# Patient Record
Sex: FEMALE | Race: WHITE | NOT HISPANIC OR LATINO | URBAN - METROPOLITAN AREA
[De-identification: names, ages, dates, MRNs, and addresses within clinical notes are randomized per-mention and may not be internally consistent; named-entity substitution may affect disease eponyms.]

---

## 2020-06-08 ENCOUNTER — CLAIMS CREATED FROM THE CLAIM WINDOW (OUTPATIENT)
Dept: URBAN - METROPOLITAN AREA CLINIC 97 | Facility: CLINIC | Age: 55
End: 2020-06-08
Payer: COMMERCIAL

## 2020-06-08 DIAGNOSIS — K51.80 CHRONIC PANCOLONIC ULCERATIVE COLITIS: ICD-10-CM

## 2020-06-08 PROCEDURE — 96413 CHEMO IV INFUSION 1 HR: CPT | Performed by: INTERNAL MEDICINE

## 2020-06-08 RX ORDER — OMEPRAZOLE 40 MG/1
TAKE 1 CAPSULE BY ORAL ROUTE 2 TIMES A DAY FOR 90 DAYS CAPSULE, DELAYED RELEASE PELLETS ORAL 2
Qty: 180 | Refills: 2 | Status: ACTIVE | COMMUNITY
Start: 2019-12-20 | End: 2020-09-15

## 2020-06-08 RX ORDER — ATENOLOL 50 MG
TABLET ORAL
Qty: 0 | Refills: 0 | Status: ACTIVE | COMMUNITY
Start: 1900-01-01 | End: 1900-01-01

## 2020-06-08 RX ORDER — CHOLESTYRAMINE 4 G/9G
TAKE 1 SCOOP (4 GRAM) DISSOLVED IN 2 TO 6 OUNCES OF WATER OR NONCARBONATED BEVERAGE BY ORAL ROUTE 2 TIMES PER DAY POWDER, FOR SUSPENSION ORAL 2
Qty: 60 | Refills: 0 | Status: ACTIVE | COMMUNITY
Start: 2019-06-21 | End: 1900-01-01

## 2020-07-14 ENCOUNTER — WEB ENCOUNTER (OUTPATIENT)
Dept: URBAN - METROPOLITAN AREA CLINIC 96 | Facility: CLINIC | Age: 55
End: 2020-07-14

## 2020-07-14 ENCOUNTER — LAB OUTSIDE AN ENCOUNTER (OUTPATIENT)
Dept: URBAN - METROPOLITAN AREA CLINIC 96 | Facility: CLINIC | Age: 55
End: 2020-07-14

## 2020-07-14 ENCOUNTER — OFFICE VISIT (OUTPATIENT)
Dept: URBAN - METROPOLITAN AREA CLINIC 96 | Facility: CLINIC | Age: 55
End: 2020-07-14
Payer: COMMERCIAL

## 2020-07-14 DIAGNOSIS — M25.50 JOINT PAIN: ICD-10-CM

## 2020-07-14 DIAGNOSIS — R94.5 ABNORMAL LFTS (LIVER FUNCTION TESTS): ICD-10-CM

## 2020-07-14 DIAGNOSIS — Z09 FOLLOW UP: ICD-10-CM

## 2020-07-14 DIAGNOSIS — K21.9 GERD: ICD-10-CM

## 2020-07-14 DIAGNOSIS — R19.7 DIARRHEA: ICD-10-CM

## 2020-07-14 DIAGNOSIS — K51.00 ULCERATIVE (CHRONIC) ENTEROCOLITIS: ICD-10-CM

## 2020-07-14 PROCEDURE — G8420 CALC BMI NORM PARAMETERS: HCPCS | Performed by: INTERNAL MEDICINE

## 2020-07-14 PROCEDURE — G9622 NO UNHEAL ETOH USER: HCPCS | Performed by: INTERNAL MEDICINE

## 2020-07-14 PROCEDURE — 1036F TOBACCO NON-USER: CPT | Performed by: INTERNAL MEDICINE

## 2020-07-14 PROCEDURE — G9903 PT SCRN TBCO ID AS NON USER: HCPCS | Performed by: INTERNAL MEDICINE

## 2020-07-14 PROCEDURE — 99214 OFFICE O/P EST MOD 30 MIN: CPT | Performed by: INTERNAL MEDICINE

## 2020-07-14 PROCEDURE — G8427 DOCREV CUR MEDS BY ELIG CLIN: HCPCS | Performed by: INTERNAL MEDICINE

## 2020-07-14 PROCEDURE — 99215 OFFICE O/P EST HI 40 MIN: CPT | Performed by: INTERNAL MEDICINE

## 2020-07-14 PROCEDURE — 3017F COLORECTAL CA SCREEN DOC REV: CPT | Performed by: INTERNAL MEDICINE

## 2020-07-14 PROCEDURE — G8482 FLU IMMUNIZE ORDER/ADMIN: HCPCS | Performed by: INTERNAL MEDICINE

## 2020-07-14 RX ORDER — OMEPRAZOLE 40 MG/1
1 TAB CAPSULE, DELAYED RELEASE PELLETS ORAL BID
Qty: 180 TABLET | Refills: 4
Start: 2019-12-20

## 2020-07-14 RX ORDER — ATENOLOL 50 MG
TABLET ORAL
Qty: 0 | Refills: 0 | Status: ACTIVE | COMMUNITY
Start: 1900-01-01 | End: 1900-01-01

## 2020-07-14 RX ORDER — OMEPRAZOLE 40 MG/1
TAKE 1 CAPSULE BY ORAL ROUTE 2 TIMES A DAY FOR 90 DAYS CAPSULE, DELAYED RELEASE PELLETS ORAL 2
Qty: 180 | Refills: 2 | Status: ACTIVE | COMMUNITY
Start: 2019-12-20 | End: 2020-09-15

## 2020-07-14 RX ORDER — CHOLESTYRAMINE 4 G/9G
TAKE 1 SCOOP (4 GRAM) DISSOLVED IN 2 TO 6 OUNCES OF WATER OR NONCARBONATED BEVERAGE BY ORAL ROUTE 2 TIMES PER DAY POWDER, FOR SUSPENSION ORAL 2
Qty: 60 | Refills: 0 | Status: ACTIVE | COMMUNITY
Start: 2019-06-21 | End: 1900-01-01

## 2020-07-14 RX ORDER — CHOLESTYRAMINE 4 G/9G
TAKE 1 SCOOP (4 GRAM) DISSOLVED IN 2 TO 6 OUNCES OF WATER OR NONCARBONATED BEVERAGE BY ORAL ROUTE 2 TIMES PER DAY POWDER, FOR SUSPENSION ORAL 2
OUTPATIENT
Start: 2019-06-21

## 2020-07-14 NOTE — HPI-TODAY'S VISIT:
Pt Terry is a 55 y/o individual with pan-UC, currently on Entyvio (started 9/2019 every 8 weeks), here for refractory disease. . Pt is referred by Dr. Mercado. . She was diagnosed with UC in 2009.  She started on ASA compound initially, stopped therapy due to cost.  Started on steroids.  Never has been on a biological medication. . Previously on 7/29/2019, she reports that she has a BM every hour.  No blood in the stool.  Abdominal pain is mainly on the right side. The pain is dull in nature, non-radiating, nothing makes it better or worse. . Previously on 9/4/2019, she reports that after starting Colazol, she developed a rash on her skin which was severe and required steroid topical agent.  She thus stopped the medication.  She has persistent diarrhea. . Previously on 10/16/2019, pt reports that since starting Entyvio (2 doses of load completed).  Her BM has improved in consistency somewhat, however, she has up to 8 per day (before was all day, now just in the morning).  Still has right sided abdominal pain, no blood. . Today on 7/14/2020, pt reports that her arthritis, 2 weeks prior her entyio, it is worse.  She is taking diclofenac for the joint pain.  No reflux sxs, well controlled on PPI.  . SH: Smoke and ETOH; Works special events/catering FH: Cousin with IBD  [facility] Labs: 6:2020: ast 52, alt 43, Cr 0.56,   9/2019: Hgb 14.2, ast 52, alt 31, Iron 55, quant-gold neg, hep B neg, B12 540,  PMH: HTN, UC . 12/2019: - High risk surveillance was performed with methylene blue dye and targetted biopsies of abnormal uptake. A localized area of erythematous mucosa was found in the cecum.  This was biopsied with a cold forceps for histology. The pathology specimen was placed into Bottle Number 1. - A 6 mm polyp was found in the ascending colon.  The polyp was sessile.  The polyp was removed with a cold snare. Resection and retrieval were complete.  The pathology specimen was placed into Bottle Number 5. - A less than 5 mm polyp was found in the transverse colon.  The polyp was sessile.  The polyp was removed with a cold biopsy forceps.  Resection and retrieval were complete.  The pathology specimen was placed into Bottle Number 2. - A less than 5 mm polyp was found in the sigmoid colon.  The polyp was sessile.  The polyp was removed with a jumbo cold forceps.  Resection and retrieval were complete.  The pathology specimen was placed into Bottle Number 3. - A localized area of erythematous mucosa was found in the rectum.  This was biopsied with a cold forceps for histology. The pathology specimen was placed into Bottle Number 4.  A.CECUM,RANDOMBIOPSY: -ARCHITECTURALLYUNREMARKABLECOLONMUCOSA. -NOACTIVITYORGRANULOMAS. -NODYSPLASIA.     B.SPECIMENSUBMITTEDASDESCENDINGCOLONPOLYP,BIOPSY: -ARCHITECTURALLYUNREMARKABLECOLONMUCOSA. -NOACTIVITYORGRANULOMAS.     -FOCUSOFHYPERPLASTICCRYPTS(L1). -NODYSPLASIA.     C.SPECIMENSUBMITTEDASSIGMOIDCOLONPOLYP,BIOPSY: -ARCHITECTURALLYUNREMARKABLECOLONMUCOSA. -NOACTIVITYORGRANULOMAS.     -FOCUSOFHYPERPLASTICCRYPTS. -NODYSPLASIA.     D.SPECIMENSUBMITTEDASRECTUMCOLONPOLYP,BIOPSY: -ARCHITECTURALLYUNREMARKABLECOLONMUCOSA. -NOACTIVITYORGRANULOMAS.     -FOCUSOFHYPERPLASTICCRYPTS. -NODYSPLASIA.     E.SPECIMENSUBMITTEDASASCENDINGCOLONPOLYP,BIOPSY: -ARCHITECTURALLYUNREMARKABLECOLONMUCOSA. -NOACTIVITYORGRANULOMAS. -NODYSPLASIA. . 6/2019 A.RIGHTCOLON,BIOPSIES: -COLONICMUCOSA,WITHINNORMALLIMITS. -NOHISTOLOGICEVIDENCEOFACTIVECOLITIS,GRANULOMAS,ORDYSPLASIA IDENTIFIED.     B.TRANSVERSECOLON,BIOPSIES: -COLONICMUCOSA,WITHINNORMALLIMITS. -NOHISTOLOGICEVIDENCEOFACTIVECOLITIS,GRANULOMAS,ORDYSPLASIA IDENTIFIED.     C.DESCENDINGCOLON,BIOPSIES: -ADENOMA-LIKEDYSPLASIAVERSUSLOWGRADEDYSPLASIA. -NOGRANULOMASORHIGHGRADEDYSPLASIAIDENTIFIED.     D.SIGMOIDCOLONBIOPSIES: -ADENOMA-LIKEDYSPLASIAVERSUSLOWGRADEDYSPLASIAANDFOCALACTIVE COLITIS. -NOGRANULOMASORHIGHGRADEDYSPLASIAIDENTIFIED. . 5/2019: A Duodenum,biopsy: Smallbowelmucosawithnosignificanthistopathology.Nohistologicevidenceofceliacsprue microorganisms.     B Stomach,body,antrum,biopsy: Chronicgastritis.NoHelicobacterpylorimicroorganismsareidentifiedwithaspecialstain.     C Esophagogastricjunction,biopsy: Squamocolumnarmucosawithrefluxesophagitis.Nointestinalmetaplasiaispresent.Analcia negativeforbothfungalelementsandintestinalmetaplasia.     D Sigmoidcolon,polypectomy: Adenoma-likedysplasia.Seemicroscopicdescription.     E Rightcolon,biopsy: Colonicmucosawithnosignificanthistopathology.Nohistologicevidenceofactivecolitis,gran     F Leftcolon,biopsy: Adenoma-likedysplasia.Seemicroscopicdescription.    G Descendingcolon,polypectomy: Adenoma-likedysplasia.Seemicroscopicdescription.     H Rectum,polyp,biopsy: Adenoma-likedysplasia.Seemicroscopicdescription

## 2020-07-21 ENCOUNTER — LAB OUTSIDE AN ENCOUNTER (OUTPATIENT)
Dept: URBAN - METROPOLITAN AREA CLINIC 96 | Facility: CLINIC | Age: 55
End: 2020-07-21

## 2020-08-03 ENCOUNTER — OFFICE VISIT (OUTPATIENT)
Dept: URBAN - METROPOLITAN AREA CLINIC 97 | Facility: CLINIC | Age: 55
End: 2020-08-03
Payer: COMMERCIAL

## 2020-08-03 DIAGNOSIS — K51.80 CHRONIC PANCOLONIC ULCERATIVE COLITIS: ICD-10-CM

## 2020-08-03 PROCEDURE — 96413 CHEMO IV INFUSION 1 HR: CPT | Performed by: INTERNAL MEDICINE

## 2020-08-03 RX ORDER — ATENOLOL 50 MG
TABLET ORAL
Qty: 0 | Refills: 0 | Status: ACTIVE | COMMUNITY
Start: 1900-01-01 | End: 1900-01-01

## 2020-08-03 RX ORDER — OMEPRAZOLE 40 MG/1
1 TAB CAPSULE, DELAYED RELEASE PELLETS ORAL BID
Qty: 180 TABLET | Refills: 4 | Status: ACTIVE | COMMUNITY
Start: 2019-12-20

## 2020-09-16 ENCOUNTER — OFFICE VISIT (OUTPATIENT)
Dept: URBAN - METROPOLITAN AREA CLINIC 97 | Facility: CLINIC | Age: 55
End: 2020-09-16
Payer: COMMERCIAL

## 2020-09-16 DIAGNOSIS — K51.80 CHRONIC PANCOLONIC ULCERATIVE COLITIS: ICD-10-CM

## 2020-09-16 PROCEDURE — 96413 CHEMO IV INFUSION 1 HR: CPT | Performed by: INTERNAL MEDICINE

## 2020-09-16 RX ORDER — OMEPRAZOLE 40 MG/1
1 TAB CAPSULE, DELAYED RELEASE PELLETS ORAL BID
Qty: 180 TABLET | Refills: 4 | Status: ACTIVE | COMMUNITY
Start: 2019-12-20

## 2020-09-16 RX ORDER — ATENOLOL 50 MG
TABLET ORAL
Qty: 0 | Refills: 0 | Status: ACTIVE | COMMUNITY
Start: 1900-01-01 | End: 1900-01-01

## 2020-10-01 ENCOUNTER — OFFICE VISIT (OUTPATIENT)
Dept: URBAN - METROPOLITAN AREA CLINIC 117 | Facility: CLINIC | Age: 55
End: 2020-10-01

## 2020-10-28 ENCOUNTER — OFFICE VISIT (OUTPATIENT)
Dept: URBAN - METROPOLITAN AREA CLINIC 97 | Facility: CLINIC | Age: 55
End: 2020-10-28
Payer: COMMERCIAL

## 2020-10-28 DIAGNOSIS — K51.80 CHRONIC PANCOLONIC ULCERATIVE COLITIS: ICD-10-CM

## 2020-10-28 PROCEDURE — 96413 CHEMO IV INFUSION 1 HR: CPT | Performed by: INTERNAL MEDICINE

## 2020-10-28 RX ORDER — ATENOLOL 50 MG
TABLET ORAL
Qty: 0 | Refills: 0 | Status: ACTIVE | COMMUNITY
Start: 1900-01-01 | End: 1900-01-01

## 2020-10-28 RX ORDER — OMEPRAZOLE 40 MG/1
1 TAB CAPSULE, DELAYED RELEASE PELLETS ORAL BID
Qty: 180 TABLET | Refills: 4 | Status: ACTIVE | COMMUNITY
Start: 2019-12-20

## 2020-12-07 ENCOUNTER — OFFICE VISIT (OUTPATIENT)
Dept: URBAN - METROPOLITAN AREA CLINIC 97 | Facility: CLINIC | Age: 55
End: 2020-12-07
Payer: COMMERCIAL

## 2020-12-07 VITALS
HEIGHT: 65 IN | DIASTOLIC BLOOD PRESSURE: 66 MMHG | TEMPERATURE: 96.8 F | SYSTOLIC BLOOD PRESSURE: 115 MMHG | RESPIRATION RATE: 17 BRPM | HEART RATE: 69 BPM | WEIGHT: 163 LBS | BODY MASS INDEX: 27.16 KG/M2

## 2020-12-07 DIAGNOSIS — K51.80 CHRONIC PANCOLONIC ULCERATIVE COLITIS: ICD-10-CM

## 2020-12-07 PROCEDURE — 96413 CHEMO IV INFUSION 1 HR: CPT | Performed by: INTERNAL MEDICINE

## 2020-12-07 RX ORDER — OMEPRAZOLE 40 MG/1
1 TAB CAPSULE, DELAYED RELEASE PELLETS ORAL BID
Qty: 180 TABLET | Refills: 4 | Status: ACTIVE | COMMUNITY
Start: 2019-12-20

## 2020-12-07 RX ORDER — ATENOLOL 50 MG
TABLET ORAL
Qty: 0 | Refills: 0 | Status: ACTIVE | COMMUNITY
Start: 1900-01-01 | End: 1900-01-01

## 2021-01-06 ENCOUNTER — OFFICE VISIT (OUTPATIENT)
Dept: URBAN - METROPOLITAN AREA CLINIC 97 | Facility: CLINIC | Age: 56
End: 2021-01-06

## 2021-01-11 ENCOUNTER — OFFICE VISIT (OUTPATIENT)
Dept: URBAN - METROPOLITAN AREA CLINIC 97 | Facility: CLINIC | Age: 56
End: 2021-01-11
Payer: COMMERCIAL

## 2021-01-11 DIAGNOSIS — K51.80 CHRONIC PANCOLONIC ULCERATIVE COLITIS: ICD-10-CM

## 2021-01-11 PROCEDURE — 96365 THER/PROPH/DIAG IV INF INIT: CPT | Performed by: INTERNAL MEDICINE

## 2021-01-11 RX ORDER — ATENOLOL 50 MG
TABLET ORAL
Qty: 0 | Refills: 0 | Status: ACTIVE | COMMUNITY
Start: 1900-01-01 | End: 1900-01-01

## 2021-01-11 RX ORDER — OMEPRAZOLE 40 MG/1
1 TAB CAPSULE, DELAYED RELEASE PELLETS ORAL BID
Qty: 180 TABLET | Refills: 4 | Status: ACTIVE | COMMUNITY
Start: 2019-12-20

## 2021-01-13 ENCOUNTER — OFFICE VISIT (OUTPATIENT)
Dept: URBAN - METROPOLITAN AREA CLINIC 91 | Facility: CLINIC | Age: 56
End: 2021-01-13

## 2021-02-15 ENCOUNTER — OFFICE VISIT (OUTPATIENT)
Dept: URBAN - METROPOLITAN AREA CLINIC 97 | Facility: CLINIC | Age: 56
End: 2021-02-15
Payer: COMMERCIAL

## 2021-02-15 DIAGNOSIS — K51.80 CHRONIC PANCOLONIC ULCERATIVE COLITIS: ICD-10-CM

## 2021-02-15 PROCEDURE — 96365 THER/PROPH/DIAG IV INF INIT: CPT | Performed by: INTERNAL MEDICINE

## 2021-02-15 RX ORDER — ATENOLOL 50 MG
TABLET ORAL
Qty: 0 | Refills: 0 | Status: ACTIVE | COMMUNITY
Start: 1900-01-01 | End: 1900-01-01

## 2021-02-15 RX ORDER — OMEPRAZOLE 40 MG/1
1 TAB CAPSULE, DELAYED RELEASE PELLETS ORAL BID
Qty: 180 TABLET | Refills: 4 | Status: ACTIVE | COMMUNITY
Start: 2019-12-20

## 2021-03-22 ENCOUNTER — OFFICE VISIT (OUTPATIENT)
Dept: URBAN - METROPOLITAN AREA CLINIC 97 | Facility: CLINIC | Age: 56
End: 2021-03-22
Payer: COMMERCIAL

## 2021-03-22 DIAGNOSIS — K51.80 CHRONIC PANCOLONIC ULCERATIVE COLITIS: ICD-10-CM

## 2021-03-22 PROCEDURE — 96365 THER/PROPH/DIAG IV INF INIT: CPT | Performed by: INTERNAL MEDICINE

## 2021-03-22 RX ORDER — OMEPRAZOLE 40 MG/1
1 TAB CAPSULE, DELAYED RELEASE PELLETS ORAL BID
Qty: 180 TABLET | Refills: 4 | Status: ACTIVE | COMMUNITY
Start: 2019-12-20

## 2021-03-22 RX ORDER — ATENOLOL 50 MG
TABLET ORAL
Qty: 0 | Refills: 0 | Status: ACTIVE | COMMUNITY
Start: 1900-01-01 | End: 1900-01-01

## 2021-04-27 ENCOUNTER — OFFICE VISIT (OUTPATIENT)
Dept: URBAN - METROPOLITAN AREA CLINIC 97 | Facility: CLINIC | Age: 56
End: 2021-04-27
Payer: COMMERCIAL

## 2021-04-27 DIAGNOSIS — K51.80 CHRONIC PANCOLONIC ULCERATIVE COLITIS: ICD-10-CM

## 2021-04-27 PROCEDURE — 96365 THER/PROPH/DIAG IV INF INIT: CPT | Performed by: INTERNAL MEDICINE

## 2021-04-27 RX ORDER — OMEPRAZOLE 40 MG/1
1 TAB CAPSULE, DELAYED RELEASE PELLETS ORAL BID
Qty: 180 TABLET | Refills: 4 | Status: ACTIVE | COMMUNITY
Start: 2019-12-20

## 2021-04-27 RX ORDER — ATENOLOL 50 MG
TABLET ORAL
Qty: 0 | Refills: 0 | Status: ACTIVE | COMMUNITY
Start: 1900-01-01 | End: 1900-01-01

## 2021-06-01 ENCOUNTER — OFFICE VISIT (OUTPATIENT)
Dept: URBAN - METROPOLITAN AREA CLINIC 97 | Facility: CLINIC | Age: 56
End: 2021-06-01

## 2021-06-03 ENCOUNTER — OFFICE VISIT (OUTPATIENT)
Dept: URBAN - METROPOLITAN AREA CLINIC 97 | Facility: CLINIC | Age: 56
End: 2021-06-03

## 2021-06-03 RX ORDER — ATENOLOL 50 MG
TABLET ORAL
Qty: 0 | Refills: 0 | Status: ACTIVE | COMMUNITY
Start: 1900-01-01 | End: 1900-01-01

## 2021-06-03 RX ORDER — OMEPRAZOLE 40 MG/1
1 TAB CAPSULE, DELAYED RELEASE PELLETS ORAL BID
Qty: 180 TABLET | Refills: 4 | Status: ACTIVE | COMMUNITY
Start: 2019-12-20

## 2021-06-10 ENCOUNTER — OFFICE VISIT (OUTPATIENT)
Dept: URBAN - METROPOLITAN AREA CLINIC 97 | Facility: CLINIC | Age: 56
End: 2021-06-10
Payer: COMMERCIAL

## 2021-06-10 DIAGNOSIS — K51.80 CHRONIC PANCOLONIC ULCERATIVE COLITIS: ICD-10-CM

## 2021-06-10 PROCEDURE — 96365 THER/PROPH/DIAG IV INF INIT: CPT | Performed by: INTERNAL MEDICINE

## 2021-06-10 RX ORDER — ATENOLOL 50 MG
TABLET ORAL
Qty: 0 | Refills: 0 | Status: ACTIVE | COMMUNITY
Start: 1900-01-01 | End: 1900-01-01

## 2021-06-10 RX ORDER — OMEPRAZOLE 40 MG/1
1 TAB CAPSULE, DELAYED RELEASE PELLETS ORAL BID
Qty: 180 TABLET | Refills: 4 | Status: ACTIVE | COMMUNITY
Start: 2019-12-20

## 2021-07-06 ENCOUNTER — OFFICE VISIT (OUTPATIENT)
Dept: URBAN - METROPOLITAN AREA CLINIC 97 | Facility: CLINIC | Age: 56
End: 2021-07-06

## 2021-07-13 ENCOUNTER — OFFICE VISIT (OUTPATIENT)
Dept: URBAN - METROPOLITAN AREA CLINIC 97 | Facility: CLINIC | Age: 56
End: 2021-07-13

## 2021-07-13 RX ORDER — OMEPRAZOLE 40 MG/1
1 TAB CAPSULE, DELAYED RELEASE PELLETS ORAL BID
Qty: 180 TABLET | Refills: 4 | Status: ACTIVE | COMMUNITY
Start: 2019-12-20

## 2021-07-13 RX ORDER — ATENOLOL 50 MG
TABLET ORAL
Qty: 0 | Refills: 0 | Status: ACTIVE | COMMUNITY
Start: 1900-01-01 | End: 1900-01-01

## 2021-07-27 ENCOUNTER — OFFICE VISIT (OUTPATIENT)
Dept: URBAN - METROPOLITAN AREA CLINIC 97 | Facility: CLINIC | Age: 56
End: 2021-07-27
Payer: COMMERCIAL

## 2021-07-27 ENCOUNTER — TELEPHONE ENCOUNTER (OUTPATIENT)
Dept: URBAN - METROPOLITAN AREA CLINIC 97 | Facility: CLINIC | Age: 56
End: 2021-07-27

## 2021-07-27 VITALS
TEMPERATURE: 98.4 F | DIASTOLIC BLOOD PRESSURE: 82 MMHG | HEART RATE: 68 BPM | BODY MASS INDEX: 27.49 KG/M2 | WEIGHT: 165 LBS | RESPIRATION RATE: 17 BRPM | HEIGHT: 65 IN | SYSTOLIC BLOOD PRESSURE: 123 MMHG

## 2021-07-27 DIAGNOSIS — K51.80 CHRONIC PANCOLONIC ULCERATIVE COLITIS: ICD-10-CM

## 2021-07-27 PROCEDURE — 96365 THER/PROPH/DIAG IV INF INIT: CPT | Performed by: INTERNAL MEDICINE

## 2021-07-27 RX ORDER — ATENOLOL 50 MG
TABLET ORAL
Qty: 0 | Refills: 0 | Status: ACTIVE | COMMUNITY
Start: 1900-01-01 | End: 1900-01-01

## 2021-07-27 RX ORDER — OMEPRAZOLE 40 MG/1
1 TAB CAPSULE, DELAYED RELEASE PELLETS ORAL BID
Qty: 180 TABLET | Refills: 4 | Status: ACTIVE | COMMUNITY
Start: 2019-12-20

## 2021-09-08 ENCOUNTER — OFFICE VISIT (OUTPATIENT)
Dept: URBAN - METROPOLITAN AREA CLINIC 97 | Facility: CLINIC | Age: 56
End: 2021-09-08
Payer: COMMERCIAL

## 2021-09-08 DIAGNOSIS — K51.80 CHRONIC PANCOLONIC ULCERATIVE COLITIS: ICD-10-CM

## 2021-09-08 PROCEDURE — 96413 CHEMO IV INFUSION 1 HR: CPT | Performed by: INTERNAL MEDICINE

## 2021-09-08 RX ORDER — ATENOLOL 50 MG
TABLET ORAL
Qty: 0 | Refills: 0 | Status: ACTIVE | COMMUNITY
Start: 1900-01-01 | End: 1900-01-01

## 2021-09-08 RX ORDER — OMEPRAZOLE 40 MG/1
1 TAB CAPSULE, DELAYED RELEASE PELLETS ORAL BID
Qty: 180 TABLET | Refills: 4 | Status: ACTIVE | COMMUNITY
Start: 2019-12-20

## 2021-10-21 ENCOUNTER — OFFICE VISIT (OUTPATIENT)
Dept: URBAN - METROPOLITAN AREA CLINIC 97 | Facility: CLINIC | Age: 56
End: 2021-10-21
Payer: COMMERCIAL

## 2021-10-21 DIAGNOSIS — K51.80 CHRONIC PANCOLONIC ULCERATIVE COLITIS: ICD-10-CM

## 2021-10-21 PROCEDURE — 96413 CHEMO IV INFUSION 1 HR: CPT | Performed by: INTERNAL MEDICINE

## 2021-10-21 RX ORDER — ATENOLOL 50 MG
TABLET ORAL
Qty: 0 | Refills: 0 | Status: ACTIVE | COMMUNITY
Start: 1900-01-01 | End: 1900-01-01

## 2021-10-21 RX ORDER — OMEPRAZOLE 40 MG/1
1 TAB CAPSULE, DELAYED RELEASE PELLETS ORAL BID
Qty: 180 TABLET | Refills: 4 | Status: ACTIVE | COMMUNITY
Start: 2019-12-20

## 2021-12-02 ENCOUNTER — OFFICE VISIT (OUTPATIENT)
Dept: URBAN - METROPOLITAN AREA CLINIC 97 | Facility: CLINIC | Age: 56
End: 2021-12-02

## 2021-12-02 RX ORDER — OMEPRAZOLE 40 MG/1
1 TAB CAPSULE, DELAYED RELEASE PELLETS ORAL BID
Qty: 180 TABLET | Refills: 4 | Status: ACTIVE | COMMUNITY
Start: 2019-12-20

## 2021-12-02 RX ORDER — ATENOLOL 50 MG
TABLET ORAL
Qty: 0 | Refills: 0 | Status: ACTIVE | COMMUNITY
Start: 1900-01-01 | End: 1900-01-01

## 2021-12-03 PROBLEM — 64766004 ULCERATIVE COLITIS: Status: ACTIVE | Noted: 2021-12-03

## 2021-12-15 ENCOUNTER — OFFICE VISIT (OUTPATIENT)
Dept: URBAN - METROPOLITAN AREA CLINIC 12 | Facility: CLINIC | Age: 56
End: 2021-12-15

## 2021-12-15 RX ORDER — ATENOLOL 50 MG
TABLET ORAL
Qty: 0 | Refills: 0 | Status: ACTIVE | COMMUNITY
Start: 1900-01-01 | End: 1900-01-01

## 2021-12-15 RX ORDER — OMEPRAZOLE 40 MG/1
1 TAB CAPSULE, DELAYED RELEASE PELLETS ORAL BID
Qty: 180 TABLET | Refills: 4 | Status: ACTIVE | COMMUNITY
Start: 2019-12-20

## 2021-12-20 ENCOUNTER — OFFICE VISIT (OUTPATIENT)
Dept: URBAN - METROPOLITAN AREA CLINIC 97 | Facility: CLINIC | Age: 56
End: 2021-12-20
Payer: COMMERCIAL

## 2021-12-20 VITALS
HEART RATE: 60 BPM | WEIGHT: 164 LBS | DIASTOLIC BLOOD PRESSURE: 79 MMHG | BODY MASS INDEX: 27.32 KG/M2 | RESPIRATION RATE: 18 BRPM | SYSTOLIC BLOOD PRESSURE: 129 MMHG | HEIGHT: 65 IN | TEMPERATURE: 97 F

## 2021-12-20 DIAGNOSIS — K51.80 CHRONIC PANCOLONIC ULCERATIVE COLITIS: ICD-10-CM

## 2021-12-20 PROCEDURE — 96413 CHEMO IV INFUSION 1 HR: CPT | Performed by: INTERNAL MEDICINE

## 2021-12-20 RX ORDER — OMEPRAZOLE 40 MG/1
1 TAB CAPSULE, DELAYED RELEASE PELLETS ORAL BID
Qty: 180 TABLET | Refills: 4 | Status: ACTIVE | COMMUNITY
Start: 2019-12-20

## 2021-12-20 RX ORDER — ATENOLOL 50 MG
TABLET ORAL
Qty: 0 | Refills: 0 | Status: ACTIVE | COMMUNITY
Start: 1900-01-01 | End: 1900-01-01

## 2021-12-23 ENCOUNTER — WEB ENCOUNTER (OUTPATIENT)
Dept: URBAN - METROPOLITAN AREA CLINIC 111 | Facility: CLINIC | Age: 56
End: 2021-12-23

## 2021-12-23 ENCOUNTER — LAB OUTSIDE AN ENCOUNTER (OUTPATIENT)
Dept: URBAN - METROPOLITAN AREA CLINIC 111 | Facility: CLINIC | Age: 56
End: 2021-12-23

## 2021-12-23 ENCOUNTER — OFFICE VISIT (OUTPATIENT)
Dept: URBAN - METROPOLITAN AREA CLINIC 111 | Facility: CLINIC | Age: 56
End: 2021-12-23
Payer: COMMERCIAL

## 2021-12-23 VITALS
HEIGHT: 65 IN | SYSTOLIC BLOOD PRESSURE: 159 MMHG | TEMPERATURE: 97 F | HEART RATE: 65 BPM | BODY MASS INDEX: 27.56 KG/M2 | DIASTOLIC BLOOD PRESSURE: 87 MMHG | WEIGHT: 165.4 LBS

## 2021-12-23 DIAGNOSIS — K51.00 ULCERATIVE PANCOLITIS WITHOUT COMPLICATION: ICD-10-CM

## 2021-12-23 DIAGNOSIS — R15.2 FECAL URGENCY: ICD-10-CM

## 2021-12-23 PROCEDURE — 99214 OFFICE O/P EST MOD 30 MIN: CPT | Performed by: STUDENT IN AN ORGANIZED HEALTH CARE EDUCATION/TRAINING PROGRAM

## 2021-12-23 RX ORDER — OMEPRAZOLE 40 MG/1
1 TAB CAPSULE, DELAYED RELEASE PELLETS ORAL BID
Qty: 180 TABLET | Refills: 4 | Status: ACTIVE | COMMUNITY
Start: 2019-12-20

## 2021-12-23 RX ORDER — ATENOLOL 50 MG
TABLET ORAL
Qty: 0 | Refills: 0 | Status: ACTIVE | COMMUNITY
Start: 1900-01-01

## 2021-12-23 NOTE — HPI-TODAY'S VISIT:
57 yo F with UC here for follow up. Diagnosed with UC in 2018.  She is on entyvio 300mg q6 weeks, usually sees Dr. Land. She has been on entyvio for 18 months.  She feels well when she takes entyvio for the first 3 weeks, and then for the next 3 weeks she has sense of urgency. Rare episodes of fecal incontinence. Denies blood in her stool. Denies abdominal pain. Denies weight loss, has maintained. Endorses nocturnal stooling.  Only other prior medication was sulfasalazine -- developed chemical burn allergy.  Initial colonoscopy in 4/2019 with Dr. Mercado -- multiple areas of adenomatous dysplasia. Repeat with Dr. Land 7/2019 with 2 areas of adenomatous dysplasia, repeat 12/2019 without areas of dysplasia on random biopsies.

## 2021-12-26 LAB
A/G RATIO: 1.8
ALBUMIN: 4.5
ALKALINE PHOSPHATASE: 83
ALT (SGPT): 14
AST (SGOT): 26
BASO (ABSOLUTE): 0.1
BASOS: 1
BILIRUBIN, TOTAL: 0.2
BUN/CREATININE RATIO: 13
BUN: 9
C-REACTIVE PROTEIN, QUANT: 3
CALCIUM: 9.6
CARBON DIOXIDE, TOTAL: 23
CHLORIDE: 98
CREATININE: 0.7
EGFR IF AFRICN AM: 111
EGFR IF NONAFRICN AM: 96
EOS (ABSOLUTE): 0.4
EOS: 5
FERRITIN, SERUM: 271
FOLATE (FOLIC ACID), SERUM: 8.2
GLOBULIN, TOTAL: 2.5
GLUCOSE: 88
HEMATOCRIT: 43.5
HEMATOLOGY COMMENTS:: (no result)
HEMOGLOBIN: 14.5
HEP B CORE AB, TOT: NEGATIVE
HEP B SURFACE AB, QUAL: NON REACTIVE
IMMATURE CELLS: (no result)
IMMATURE GRANS (ABS): 0
IMMATURE GRANULOCYTES: 0
IRON BIND.CAP.(TIBC): 308
IRON SATURATION: 35
IRON: 108
LYMPHS (ABSOLUTE): 2.5
LYMPHS: 31
MCH: 33.2
MCHC: 33.3
MCV: 100
MONOCYTES(ABSOLUTE): 0.5
MONOCYTES: 6
NEUTROPHILS (ABSOLUTE): 4.6
NEUTROPHILS: 57
NRBC: (no result)
PLATELETS: 288
POTASSIUM: 4.6
PROTEIN, TOTAL: 7
QUANTIFERON CRITERIA: (no result)
QUANTIFERON INCUBATION: (no result)
QUANTIFERON MITOGEN VALUE: >10
QUANTIFERON NIL VALUE: 0
QUANTIFERON TB1 AG VALUE: 0
QUANTIFERON TB2 AG VALUE: 0
QUANTIFERON-TB GOLD PLUS: NEGATIVE
RBC: 4.37
RDW: 12.5
SEDIMENTATION RATE-WESTERGREN: 25
SODIUM: 136
UIBC: 200
VITAMIN B12: 472
VITAMIN D, 25-HYDROXY: 21.1
WBC: 8

## 2021-12-27 ENCOUNTER — TELEPHONE ENCOUNTER (OUTPATIENT)
Dept: URBAN - METROPOLITAN AREA CLINIC 92 | Facility: CLINIC | Age: 56
End: 2021-12-27

## 2021-12-27 PROBLEM — 34713006: Status: ACTIVE | Noted: 2021-12-27

## 2021-12-27 RX ORDER — ATENOLOL 50 MG
TABLET ORAL
Qty: 0 | Refills: 0 | Status: ACTIVE | COMMUNITY
Start: 1900-01-01

## 2021-12-27 RX ORDER — OMEPRAZOLE 40 MG/1
1 TAB CAPSULE, DELAYED RELEASE PELLETS ORAL BID
Qty: 180 TABLET | Refills: 4 | Status: ACTIVE | COMMUNITY
Start: 2019-12-20

## 2021-12-27 RX ORDER — ERGOCALCIFEROL 1.25 MG/1
1 CAPSULE CAPSULE ORAL
Qty: 8 | Refills: 0 | OUTPATIENT
Start: 2021-12-27 | End: 2022-08-24

## 2021-12-30 ENCOUNTER — OFFICE VISIT (OUTPATIENT)
Dept: URBAN - METROPOLITAN AREA LAB 3 | Facility: LAB | Age: 56
End: 2021-12-30

## 2022-01-03 ENCOUNTER — TELEPHONE ENCOUNTER (OUTPATIENT)
Dept: URBAN - METROPOLITAN AREA CLINIC 111 | Facility: CLINIC | Age: 57
End: 2022-01-03

## 2022-01-04 ENCOUNTER — OFFICE VISIT (OUTPATIENT)
Dept: URBAN - METROPOLITAN AREA LAB 3 | Facility: LAB | Age: 57
End: 2022-01-04

## 2022-01-18 ENCOUNTER — OFFICE VISIT (OUTPATIENT)
Dept: URBAN - METROPOLITAN AREA LAB 3 | Facility: LAB | Age: 57
End: 2022-01-18
Payer: COMMERCIAL

## 2022-01-18 DIAGNOSIS — K51.00 ACUTE ULCERATIVE PANCOLITIS: ICD-10-CM

## 2022-01-18 PROCEDURE — 45380 COLONOSCOPY AND BIOPSY: CPT | Performed by: STUDENT IN AN ORGANIZED HEALTH CARE EDUCATION/TRAINING PROGRAM

## 2022-01-18 RX ORDER — OMEPRAZOLE 40 MG/1
1 TAB CAPSULE, DELAYED RELEASE PELLETS ORAL BID
Qty: 180 TABLET | Refills: 4 | Status: ACTIVE | COMMUNITY
Start: 2019-12-20

## 2022-01-18 RX ORDER — ERGOCALCIFEROL 1.25 MG/1
1 CAPSULE CAPSULE ORAL
Qty: 8 | Refills: 0 | Status: ACTIVE | COMMUNITY
Start: 2021-12-27 | End: 2022-08-24

## 2022-01-18 RX ORDER — ATENOLOL 50 MG
TABLET ORAL
Qty: 0 | Refills: 0 | Status: ACTIVE | COMMUNITY
Start: 1900-01-01

## 2022-01-21 ENCOUNTER — TELEPHONE ENCOUNTER (OUTPATIENT)
Dept: URBAN - METROPOLITAN AREA CLINIC 92 | Facility: CLINIC | Age: 57
End: 2022-01-21

## 2022-02-02 ENCOUNTER — OFFICE VISIT (OUTPATIENT)
Dept: URBAN - METROPOLITAN AREA CLINIC 97 | Facility: CLINIC | Age: 57
End: 2022-02-02
Payer: COMMERCIAL

## 2022-02-02 DIAGNOSIS — K51.80 CHRONIC PANCOLONIC ULCERATIVE COLITIS: ICD-10-CM

## 2022-02-02 PROCEDURE — 96413 CHEMO IV INFUSION 1 HR: CPT | Performed by: INTERNAL MEDICINE

## 2022-02-02 RX ORDER — ATENOLOL 50 MG
TABLET ORAL
Qty: 0 | Refills: 0 | Status: ACTIVE | COMMUNITY
Start: 1900-01-01

## 2022-02-02 RX ORDER — ERGOCALCIFEROL 1.25 MG/1
1 CAPSULE CAPSULE ORAL
Qty: 8 | Refills: 0 | Status: ACTIVE | COMMUNITY
Start: 2021-12-27 | End: 2022-08-24

## 2022-02-02 RX ORDER — OMEPRAZOLE 40 MG/1
1 TAB CAPSULE, DELAYED RELEASE PELLETS ORAL BID
Qty: 180 TABLET | Refills: 4 | Status: ACTIVE | COMMUNITY
Start: 2019-12-20

## 2022-03-16 ENCOUNTER — OFFICE VISIT (OUTPATIENT)
Dept: URBAN - METROPOLITAN AREA CLINIC 97 | Facility: CLINIC | Age: 57
End: 2022-03-16
Payer: COMMERCIAL

## 2022-03-16 VITALS
TEMPERATURE: 96.6 F | WEIGHT: 165 LBS | HEIGHT: 65 IN | BODY MASS INDEX: 27.49 KG/M2 | DIASTOLIC BLOOD PRESSURE: 74 MMHG | RESPIRATION RATE: 18 BRPM | SYSTOLIC BLOOD PRESSURE: 121 MMHG | HEART RATE: 63 BPM

## 2022-03-16 DIAGNOSIS — K51.80 CHRONIC PANCOLONIC ULCERATIVE COLITIS: ICD-10-CM

## 2022-03-16 PROCEDURE — 96413 CHEMO IV INFUSION 1 HR: CPT | Performed by: INTERNAL MEDICINE

## 2022-03-16 RX ORDER — ERGOCALCIFEROL 1.25 MG/1
1 CAPSULE CAPSULE ORAL
Qty: 8 | Refills: 0 | Status: ACTIVE | COMMUNITY
Start: 2021-12-27 | End: 2022-08-24

## 2022-03-16 RX ORDER — OMEPRAZOLE 40 MG/1
1 TAB CAPSULE, DELAYED RELEASE PELLETS ORAL BID
Qty: 180 TABLET | Refills: 4 | Status: ACTIVE | COMMUNITY
Start: 2019-12-20

## 2022-03-16 RX ORDER — ATENOLOL 50 MG
TABLET ORAL
Qty: 0 | Refills: 0 | Status: ACTIVE | COMMUNITY
Start: 1900-01-01

## 2022-04-27 ENCOUNTER — OFFICE VISIT (OUTPATIENT)
Dept: URBAN - METROPOLITAN AREA CLINIC 97 | Facility: CLINIC | Age: 57
End: 2022-04-27
Payer: COMMERCIAL

## 2022-04-27 VITALS
TEMPERATURE: 96.8 F | BODY MASS INDEX: 27.49 KG/M2 | HEIGHT: 65 IN | HEART RATE: 58 BPM | SYSTOLIC BLOOD PRESSURE: 146 MMHG | RESPIRATION RATE: 18 BRPM | WEIGHT: 165 LBS | DIASTOLIC BLOOD PRESSURE: 83 MMHG

## 2022-04-27 DIAGNOSIS — K51.80 CHRONIC PANCOLONIC ULCERATIVE COLITIS: ICD-10-CM

## 2022-04-27 PROCEDURE — 96413 CHEMO IV INFUSION 1 HR: CPT | Performed by: INTERNAL MEDICINE

## 2022-04-27 RX ORDER — ERGOCALCIFEROL 1.25 MG/1
1 CAPSULE CAPSULE ORAL
Qty: 8 | Refills: 0 | Status: ACTIVE | COMMUNITY
Start: 2021-12-27 | End: 2022-08-24

## 2022-04-27 RX ORDER — OMEPRAZOLE 40 MG/1
1 TAB CAPSULE, DELAYED RELEASE PELLETS ORAL BID
Qty: 180 TABLET | Refills: 4 | Status: ACTIVE | COMMUNITY
Start: 2019-12-20

## 2022-04-27 RX ORDER — ATENOLOL 50 MG
TABLET ORAL
Qty: 0 | Refills: 0 | Status: ACTIVE | COMMUNITY
Start: 1900-01-01

## 2022-06-08 ENCOUNTER — OFFICE VISIT (OUTPATIENT)
Dept: URBAN - METROPOLITAN AREA CLINIC 97 | Facility: CLINIC | Age: 57
End: 2022-06-08

## 2022-06-08 RX ORDER — OMEPRAZOLE 40 MG/1
1 TAB CAPSULE, DELAYED RELEASE PELLETS ORAL BID
Qty: 180 TABLET | Refills: 4 | Status: ACTIVE | COMMUNITY
Start: 2019-12-20

## 2022-06-08 RX ORDER — ERGOCALCIFEROL 1.25 MG/1
1 CAPSULE CAPSULE ORAL
Qty: 8 | Refills: 0 | Status: ACTIVE | COMMUNITY
Start: 2021-12-27 | End: 2022-08-24

## 2022-06-08 RX ORDER — ATENOLOL 50 MG
TABLET ORAL
Qty: 0 | Refills: 0 | Status: ACTIVE | COMMUNITY
Start: 1900-01-01

## 2022-06-13 ENCOUNTER — OFFICE VISIT (OUTPATIENT)
Dept: URBAN - METROPOLITAN AREA CLINIC 97 | Facility: CLINIC | Age: 57
End: 2022-06-13

## 2022-06-15 ENCOUNTER — OFFICE VISIT (OUTPATIENT)
Dept: URBAN - METROPOLITAN AREA CLINIC 97 | Facility: CLINIC | Age: 57
End: 2022-06-15
Payer: COMMERCIAL

## 2022-06-15 VITALS
BODY MASS INDEX: 26.33 KG/M2 | TEMPERATURE: 96.9 F | RESPIRATION RATE: 16 BRPM | SYSTOLIC BLOOD PRESSURE: 115 MMHG | WEIGHT: 158 LBS | HEART RATE: 55 BPM | DIASTOLIC BLOOD PRESSURE: 80 MMHG | HEIGHT: 65 IN

## 2022-06-15 DIAGNOSIS — K51.80 CHRONIC PANCOLONIC ULCERATIVE COLITIS: ICD-10-CM

## 2022-06-15 PROCEDURE — 96413 CHEMO IV INFUSION 1 HR: CPT | Performed by: INTERNAL MEDICINE

## 2022-06-15 RX ORDER — ATENOLOL 50 MG
TABLET ORAL
Qty: 0 | Refills: 0 | Status: ACTIVE | COMMUNITY
Start: 1900-01-01

## 2022-06-15 RX ORDER — ERGOCALCIFEROL 1.25 MG/1
1 CAPSULE CAPSULE ORAL
Qty: 8 | Refills: 0 | Status: ACTIVE | COMMUNITY
Start: 2021-12-27 | End: 2022-08-24

## 2022-06-15 RX ORDER — OMEPRAZOLE 40 MG/1
1 TAB CAPSULE, DELAYED RELEASE PELLETS ORAL BID
Qty: 180 TABLET | Refills: 4 | Status: ACTIVE | COMMUNITY
Start: 2019-12-20

## 2022-07-27 ENCOUNTER — OFFICE VISIT (OUTPATIENT)
Dept: URBAN - METROPOLITAN AREA CLINIC 97 | Facility: CLINIC | Age: 57
End: 2022-07-27
Payer: COMMERCIAL

## 2022-07-27 VITALS
SYSTOLIC BLOOD PRESSURE: 101 MMHG | WEIGHT: 158 LBS | TEMPERATURE: 96.7 F | HEIGHT: 65 IN | RESPIRATION RATE: 18 BRPM | HEART RATE: 69 BPM | DIASTOLIC BLOOD PRESSURE: 60 MMHG | BODY MASS INDEX: 26.33 KG/M2

## 2022-07-27 DIAGNOSIS — K51.90 ACUTE ULCERATIVE COLITIS: ICD-10-CM

## 2022-07-27 PROCEDURE — 96413 CHEMO IV INFUSION 1 HR: CPT | Performed by: INTERNAL MEDICINE

## 2022-07-27 RX ORDER — ERGOCALCIFEROL 1.25 MG/1
1 CAPSULE CAPSULE ORAL
Qty: 8 | Refills: 0 | Status: ACTIVE | COMMUNITY
Start: 2021-12-27 | End: 2022-08-24

## 2022-07-27 RX ORDER — OMEPRAZOLE 40 MG/1
1 TAB CAPSULE, DELAYED RELEASE PELLETS ORAL BID
Qty: 180 TABLET | Refills: 4 | Status: ACTIVE | COMMUNITY
Start: 2019-12-20

## 2022-07-27 RX ORDER — ATENOLOL 50 MG
TABLET ORAL
Qty: 0 | Refills: 0 | Status: ACTIVE | COMMUNITY
Start: 1900-01-01

## 2022-09-07 ENCOUNTER — OFFICE VISIT (OUTPATIENT)
Dept: URBAN - METROPOLITAN AREA CLINIC 97 | Facility: CLINIC | Age: 57
End: 2022-09-07
Payer: COMMERCIAL

## 2022-09-07 VITALS
RESPIRATION RATE: 20 BRPM | HEIGHT: 65 IN | TEMPERATURE: 98.2 F | HEART RATE: 75 BPM | SYSTOLIC BLOOD PRESSURE: 104 MMHG | DIASTOLIC BLOOD PRESSURE: 68 MMHG

## 2022-09-07 DIAGNOSIS — K51.90 ACUTE ULCERATIVE COLITIS: ICD-10-CM

## 2022-09-07 PROCEDURE — 96413 CHEMO IV INFUSION 1 HR: CPT | Performed by: INTERNAL MEDICINE

## 2022-09-07 RX ORDER — ATENOLOL 50 MG
TABLET ORAL
Qty: 0 | Refills: 0 | Status: ACTIVE | COMMUNITY
Start: 1900-01-01

## 2022-09-07 RX ORDER — OMEPRAZOLE 40 MG/1
1 TAB CAPSULE, DELAYED RELEASE PELLETS ORAL BID
Qty: 180 TABLET | Refills: 4 | Status: ACTIVE | COMMUNITY
Start: 2019-12-20

## 2022-09-13 ENCOUNTER — TELEPHONE ENCOUNTER (OUTPATIENT)
Dept: URBAN - METROPOLITAN AREA CLINIC 97 | Facility: CLINIC | Age: 57
End: 2022-09-13

## 2022-09-13 RX ORDER — VEDOLIZUMAB 300 MG/5ML
AS DIRECTED INJECTION, POWDER, LYOPHILIZED, FOR SOLUTION INTRAVENOUS
Qty: 300 | Refills: 6 | OUTPATIENT
Start: 2022-09-15 | End: 2024-06-06

## 2022-09-15 PROBLEM — 444548001: Status: ACTIVE | Noted: 2021-12-23

## 2022-10-19 ENCOUNTER — OFFICE VISIT (OUTPATIENT)
Dept: URBAN - METROPOLITAN AREA CLINIC 97 | Facility: CLINIC | Age: 57
End: 2022-10-19
Payer: COMMERCIAL

## 2022-10-19 VITALS
SYSTOLIC BLOOD PRESSURE: 113 MMHG | DIASTOLIC BLOOD PRESSURE: 78 MMHG | HEIGHT: 65 IN | HEART RATE: 77 BPM | TEMPERATURE: 97.7 F | RESPIRATION RATE: 20 BRPM

## 2022-10-19 DIAGNOSIS — K51.90 ACUTE ULCERATIVE COLITIS: ICD-10-CM

## 2022-10-19 PROCEDURE — 96413 CHEMO IV INFUSION 1 HR: CPT | Performed by: INTERNAL MEDICINE

## 2022-10-19 RX ORDER — VEDOLIZUMAB 300 MG/5ML
AS DIRECTED INJECTION, POWDER, LYOPHILIZED, FOR SOLUTION INTRAVENOUS
Qty: 300 | Refills: 6 | Status: ACTIVE | COMMUNITY
Start: 2022-09-15 | End: 2024-06-06

## 2022-10-19 RX ORDER — ATENOLOL 50 MG
TABLET ORAL
Qty: 0 | Refills: 0 | Status: ACTIVE | COMMUNITY
Start: 1900-01-01

## 2022-10-19 RX ORDER — OMEPRAZOLE 40 MG/1
1 TAB CAPSULE, DELAYED RELEASE PELLETS ORAL BID
Qty: 180 TABLET | Refills: 4 | Status: ACTIVE | COMMUNITY
Start: 2019-12-20

## 2022-11-30 ENCOUNTER — OFFICE VISIT (OUTPATIENT)
Dept: URBAN - METROPOLITAN AREA CLINIC 97 | Facility: CLINIC | Age: 57
End: 2022-11-30

## 2022-12-05 ENCOUNTER — OFFICE VISIT (OUTPATIENT)
Dept: URBAN - METROPOLITAN AREA CLINIC 97 | Facility: CLINIC | Age: 57
End: 2022-12-05

## 2022-12-05 ENCOUNTER — TELEPHONE ENCOUNTER (OUTPATIENT)
Dept: URBAN - METROPOLITAN AREA CLINIC 97 | Facility: CLINIC | Age: 57
End: 2022-12-05

## 2022-12-06 ENCOUNTER — TELEPHONE ENCOUNTER (OUTPATIENT)
Dept: URBAN - METROPOLITAN AREA CLINIC 6 | Facility: CLINIC | Age: 57
End: 2022-12-06

## 2022-12-16 ENCOUNTER — TELEPHONE ENCOUNTER (OUTPATIENT)
Dept: URBAN - METROPOLITAN AREA CLINIC 23 | Facility: CLINIC | Age: 57
End: 2022-12-16

## 2022-12-23 ENCOUNTER — TELEPHONE ENCOUNTER (OUTPATIENT)
Dept: URBAN - METROPOLITAN AREA CLINIC 97 | Facility: CLINIC | Age: 57
End: 2022-12-23

## 2022-12-23 RX ORDER — VEDOLIZUMAB 300 MG/5ML
AS DIRECTED INJECTION, POWDER, LYOPHILIZED, FOR SOLUTION INTRAVENOUS
Qty: 300 | OUTPATIENT
Start: 2022-12-28 | End: 2023-03-28

## 2022-12-28 PROBLEM — 38013005: Status: ACTIVE | Noted: 2022-12-28

## 2022-12-30 LAB
MITOGEN-NIL: >10
QUANTIFERON NIL VALUE: 0.05
QUANTIFERON TB1 AG VALUE: 0.03
QUANTIFERON TB2 AG VALUE: 0.03
QUANTIFERON-TB GOLD PLUS: NEGATIVE

## 2023-01-04 ENCOUNTER — OFFICE VISIT (OUTPATIENT)
Dept: URBAN - METROPOLITAN AREA CLINIC 97 | Facility: CLINIC | Age: 58
End: 2023-01-04

## 2023-01-10 ENCOUNTER — TELEPHONE ENCOUNTER (OUTPATIENT)
Dept: URBAN - METROPOLITAN AREA CLINIC 97 | Facility: CLINIC | Age: 58
End: 2023-01-10

## 2023-02-02 ENCOUNTER — TELEPHONE ENCOUNTER (OUTPATIENT)
Dept: URBAN - METROPOLITAN AREA CLINIC 111 | Facility: CLINIC | Age: 58
End: 2023-02-02

## 2023-02-02 RX ORDER — VEDOLIZUMAB 300 MG/5ML
AS DIRECTED INJECTION, POWDER, LYOPHILIZED, FOR SOLUTION INTRAVENOUS
Qty: 300 | Refills: 9 | OUTPATIENT
Start: 2023-02-06 | End: 2025-07-25

## 2023-02-07 ENCOUNTER — TELEPHONE ENCOUNTER (OUTPATIENT)
Dept: URBAN - METROPOLITAN AREA CLINIC 97 | Facility: CLINIC | Age: 58
End: 2023-02-07

## 2023-02-15 ENCOUNTER — OFFICE VISIT (OUTPATIENT)
Age: 58
End: 2023-02-15

## 2024-02-26 ENCOUNTER — OV EP (OUTPATIENT)
Dept: URBAN - METROPOLITAN AREA CLINIC 12 | Facility: CLINIC | Age: 59
End: 2024-02-26

## 2024-02-26 VITALS
HEIGHT: 65 IN | BODY MASS INDEX: 26.16 KG/M2 | DIASTOLIC BLOOD PRESSURE: 94 MMHG | TEMPERATURE: 97.9 F | HEART RATE: 62 BPM | SYSTOLIC BLOOD PRESSURE: 176 MMHG | WEIGHT: 157 LBS

## 2024-02-26 RX ORDER — VEDOLIZUMAB 300 MG/5ML
AS DIRECTED INJECTION, POWDER, LYOPHILIZED, FOR SOLUTION INTRAVENOUS
Qty: 300 | Refills: 9
Start: 2023-02-06 | End: 2026-08-14

## 2024-02-26 RX ORDER — OMEPRAZOLE 40 MG/1
1 TAB CAPSULE, DELAYED RELEASE PELLETS ORAL BID
Qty: 180 TABLET | Refills: 4 | Status: ACTIVE | COMMUNITY
Start: 2019-12-20

## 2024-02-26 RX ORDER — OMEPRAZOLE 40 MG/1
1 TAB CAPSULE, DELAYED RELEASE PELLETS ORAL TWICE DAILY
Qty: 180 | Refills: 3
Start: 2019-12-20

## 2024-02-26 RX ORDER — VEDOLIZUMAB 300 MG/5ML
AS DIRECTED INJECTION, POWDER, LYOPHILIZED, FOR SOLUTION INTRAVENOUS
Qty: 300 | Refills: 6 | Status: ON HOLD | COMMUNITY
Start: 2022-09-15 | End: 2024-06-06

## 2024-02-26 RX ORDER — VEDOLIZUMAB 300 MG/5ML
AS DIRECTED INJECTION, POWDER, LYOPHILIZED, FOR SOLUTION INTRAVENOUS
Qty: 300 | Refills: 9 | Status: ACTIVE | COMMUNITY
Start: 2023-02-06 | End: 2025-07-25

## 2024-02-26 RX ORDER — ATENOLOL 50 MG
TABLET ORAL
Qty: 0 | Refills: 0 | Status: ACTIVE | COMMUNITY
Start: 1900-01-01

## 2024-02-26 NOTE — HPI-TODAY'S VISIT:
She says entyvio is working great for her Says it took 6-8 months to work But notw feels wells. Solid formed stool.

## 2024-02-26 NOTE — HPI-OTHER HISTORIES
Last visit: 57 yo F with UC here for follow up. Diagnosed with UC in 2018. She is on entyvio 300mg q6 weeks, usually sees Dr. Land. She has been on entyvio for 18 months. She feels well when she takes entyvio for the first 3 weeks, and then for the next 3 weeks she has sense of urgency. Rare episodes of fecal incontinence. Denies blood in her stool. Denies abdominal pain. Denies weight loss, has maintained. Endorses nocturnal stooling. Only other prior medication was sulfasalazine -- developed chemical burn allergy. Initial colonoscopy in 4/2019 with Dr. Mercado -- multiple areas of adenomatous dysplasia. Repeat with Dr. Land 7/2019 with 2 areas of adenomatous dysplasia, repeat 12/2019 without areas of dysplasia on random biopsies.

## 2025-03-27 ENCOUNTER — DASHBOARD ENCOUNTERS (OUTPATIENT)
Age: 60
End: 2025-03-27

## 2025-03-27 ENCOUNTER — OFFICE VISIT (OUTPATIENT)
Dept: URBAN - METROPOLITAN AREA CLINIC 12 | Facility: CLINIC | Age: 60
End: 2025-03-27

## 2025-03-27 RX ORDER — VEDOLIZUMAB 300 MG/5ML
AS DIRECTED INJECTION, POWDER, LYOPHILIZED, FOR SOLUTION INTRAVENOUS
Qty: 300 | Refills: 9 | Status: ACTIVE | COMMUNITY
Start: 2023-02-06 | End: 2026-08-14

## 2025-03-27 RX ORDER — VEDOLIZUMAB 300 MG/5ML
AS DIRECTED INJECTION, POWDER, LYOPHILIZED, FOR SOLUTION INTRAVENOUS
Qty: 300 | Refills: 3 | OUTPATIENT
Start: 2025-03-27 | End: 2026-03-22

## 2025-03-27 RX ORDER — PREDNISONE 10 MG/1
2 TABLETS ONCE A DAY FOR 3 DAYS, 1.5 TABLETS ONCE A DAY FOR 3 DAYS, 1 TABLET ONCE A DAY FOR 3 DAYS, 0.5 TABLET ONCE A DAY FOR 3 DAYS TABLET ORAL ONCE A DAY
Qty: 15 | Refills: 1 | OUTPATIENT
Start: 2025-03-27

## 2025-03-27 RX ORDER — ATENOLOL 50 MG
TABLET ORAL
Qty: 0 | Refills: 0 | Status: ACTIVE | COMMUNITY
Start: 1900-01-01

## 2025-03-27 RX ORDER — OMEPRAZOLE 40 MG/1
1 TAB CAPSULE, DELAYED RELEASE PELLETS ORAL TWICE DAILY
Qty: 180 | Refills: 3 | Status: ACTIVE | COMMUNITY
Start: 2019-12-20

## 2025-03-27 RX ORDER — VEDOLIZUMAB 108 MG/.68ML
AS DIRECTED INJECTION, SOLUTION SUBCUTANEOUS EVERY 2 WEEKS
Qty: 2 | Refills: 11 | OUTPATIENT
Start: 2025-03-27 | End: 2026-02-26

## 2025-03-27 NOTE — HPI-TODAY'S VISIT:
60 yo F here for follow up. She has been in clinical remission on Entyvio She was due for infusion on Rommel 15 but prescription ran out and needed a refill She has some mucous in her stool now but not often. No blood in stool. Her last colonoscopy was in 2022.  She is interested in switching to entyvio pens.

## 2025-03-27 NOTE — PHYSICAL EXAM CARDIOVASCULAR:
regular rate and rhythm , no edema
,DirectAddress_Unknown,DirectAddress_Unknown,DirectAddress_Unknown

## 2025-04-09 ENCOUNTER — TELEPHONE ENCOUNTER (OUTPATIENT)
Dept: URBAN - METROPOLITAN AREA CLINIC 12 | Facility: CLINIC | Age: 60
End: 2025-04-09

## 2025-04-11 ENCOUNTER — TELEPHONE ENCOUNTER (OUTPATIENT)
Dept: URBAN - METROPOLITAN AREA CLINIC 6 | Facility: CLINIC | Age: 60
End: 2025-04-11

## 2025-04-25 ENCOUNTER — TELEPHONE ENCOUNTER (OUTPATIENT)
Dept: URBAN - METROPOLITAN AREA CLINIC 12 | Facility: CLINIC | Age: 60
End: 2025-04-25

## 2025-04-25 RX ORDER — VEDOLIZUMAB 108 MG/.68ML
0.68ML EVERY 2 WEEKS INJECTION, SOLUTION SUBCUTANEOUS EVERY 2 WEEKS
Qty: 2 | Refills: 11 | OUTPATIENT
Start: 2025-04-25 | End: 2026-03-27